# Patient Record
(demographics unavailable — no encounter records)

---

## 2024-12-16 NOTE — ASSESSMENT
[FreeTextEntry1] : Impression is of migraine headaches. I renewed propranolol and she has an adequate amount of eletriptan because she had 3 refills and 6 fills. If she needs more, which is unlikely, she'll call us. We will see her in follow-up in 3 months and see if she's continuing to do as well.     Entered by Marla Jack acting as scribe for Dr. Marcelo.     The documentation recorded by the scribe, in my presence, accurately reflects the service I personally performed, and the decisions made by me with my edits as appropriate. Danie Marcelo MD, FAAN, FACP Diplomate [employer].

## 2024-12-16 NOTE — HISTORY OF PRESENT ILLNESS
[FreeTextEntry1] : The patient is a 46-year-old woman who is being seen for symptoms of headaches in the right frontal region and extending along the right side of the head. She went to the emergency room and had a CT scan of the brain, which was negative. The right frontal headaches have changed and now she has right occipital headaches. She also gets nauseated and dizzy. She was given Excedrin migraine and she said it made her sick and she stopped that. She thinks it may have been related to the caffeine. She tried ibuprofen, but that has not eliminated the headache completely. Patient has not had a history of chronic headaches in the past.   MRI of the brain revealed a few scattered microvascular ischemic white matter changes unchanged from the previous MRI in 2021.   Patient was put on propranolol 120mg QD, which suppressed the headaches but she started to feel lightheaded or dizzy, so her cardiologist recommended decreasing the dose. We decreased it to 80mg and she is not having those symptoms of lightheadedness or dizziness anymore. She took a total of 4 eletriptan 40mg in the 3-month period for her headaches so she's doing very well with that combination. This means her migraine is under excellent control. Patient did however have low blood pressure symptoms because she is also on antihypertensive, and her internist suggested we lower the dosage.